# Patient Record
Sex: MALE | Race: WHITE | NOT HISPANIC OR LATINO | URBAN - METROPOLITAN AREA
[De-identification: names, ages, dates, MRNs, and addresses within clinical notes are randomized per-mention and may not be internally consistent; named-entity substitution may affect disease eponyms.]

---

## 2019-06-13 ENCOUNTER — ON CAMPUS - OUTPATIENT (OUTPATIENT)
Dept: URBAN - METROPOLITAN AREA HOSPITAL 77 | Facility: HOSPITAL | Age: 84
End: 2019-06-13
Payer: COMMERCIAL

## 2019-06-13 DIAGNOSIS — K22.2 ESOPHAGEAL OBSTRUCTION: ICD-10-CM

## 2019-06-13 DIAGNOSIS — R13.10 DYSPHAGIA, UNSPECIFIED: ICD-10-CM

## 2019-06-13 PROCEDURE — 43249 ESOPH EGD DILATION <30 MM: CPT | Performed by: INTERNAL MEDICINE

## 2023-03-22 ENCOUNTER — OFFICE (OUTPATIENT)
Dept: URBAN - METROPOLITAN AREA CLINIC 64 | Facility: CLINIC | Age: 88
End: 2023-03-22
Payer: COMMERCIAL

## 2023-03-22 VITALS
SYSTOLIC BLOOD PRESSURE: 167 MMHG | HEART RATE: 65 BPM | DIASTOLIC BLOOD PRESSURE: 90 MMHG | WEIGHT: 199 LBS | HEIGHT: 70 IN

## 2023-03-22 DIAGNOSIS — K22.2 ESOPHAGEAL OBSTRUCTION: ICD-10-CM

## 2023-03-22 DIAGNOSIS — K59.00 CONSTIPATION, UNSPECIFIED: ICD-10-CM

## 2023-03-22 DIAGNOSIS — R13.10 DYSPHAGIA, UNSPECIFIED: ICD-10-CM

## 2023-03-22 PROCEDURE — 99203 OFFICE O/P NEW LOW 30 MIN: CPT

## 2023-03-23 ENCOUNTER — ON CAMPUS - OUTPATIENT (OUTPATIENT)
Dept: URBAN - METROPOLITAN AREA HOSPITAL 77 | Facility: HOSPITAL | Age: 88
End: 2023-03-23
Payer: COMMERCIAL

## 2023-03-23 DIAGNOSIS — K29.70 GASTRITIS, UNSPECIFIED, WITHOUT BLEEDING: ICD-10-CM

## 2023-03-23 DIAGNOSIS — K21.9 GASTRO-ESOPHAGEAL REFLUX DISEASE WITHOUT ESOPHAGITIS: ICD-10-CM

## 2023-03-23 DIAGNOSIS — R13.10 DYSPHAGIA, UNSPECIFIED: ICD-10-CM

## 2023-03-23 DIAGNOSIS — K22.2 ESOPHAGEAL OBSTRUCTION: ICD-10-CM

## 2023-03-23 DIAGNOSIS — K44.9 DIAPHRAGMATIC HERNIA WITHOUT OBSTRUCTION OR GANGRENE: ICD-10-CM

## 2023-03-23 PROCEDURE — 43249 ESOPH EGD DILATION <30 MM: CPT | Performed by: INTERNAL MEDICINE

## 2023-03-23 PROCEDURE — 43239 EGD BIOPSY SINGLE/MULTIPLE: CPT | Mod: 59 | Performed by: INTERNAL MEDICINE

## 2023-04-10 ENCOUNTER — OFFICE (OUTPATIENT)
Dept: URBAN - METROPOLITAN AREA CLINIC 64 | Facility: CLINIC | Age: 88
End: 2023-04-10
Payer: COMMERCIAL

## 2023-04-10 VITALS
HEIGHT: 70 IN | HEART RATE: 68 BPM | WEIGHT: 193 LBS | DIASTOLIC BLOOD PRESSURE: 82 MMHG | SYSTOLIC BLOOD PRESSURE: 138 MMHG

## 2023-04-10 DIAGNOSIS — Z79.82 LONG TERM (CURRENT) USE OF ASPIRIN: ICD-10-CM

## 2023-04-10 DIAGNOSIS — K21.9 GASTRO-ESOPHAGEAL REFLUX DISEASE WITHOUT ESOPHAGITIS: ICD-10-CM

## 2023-04-10 DIAGNOSIS — R06.02 SHORTNESS OF BREATH: ICD-10-CM

## 2023-04-10 DIAGNOSIS — K29.70 GASTRITIS, UNSPECIFIED, WITHOUT BLEEDING: ICD-10-CM

## 2023-04-10 DIAGNOSIS — R13.10 DYSPHAGIA, UNSPECIFIED: ICD-10-CM

## 2023-04-10 DIAGNOSIS — R05.9 COUGH, UNSPECIFIED: ICD-10-CM

## 2023-04-10 PROCEDURE — 99214 OFFICE O/P EST MOD 30 MIN: CPT

## 2023-04-10 RX ORDER — FAMOTIDINE 40 MG/1
TABLET, FILM COATED ORAL
Qty: 90 | Refills: 3 | Status: ACTIVE
Start: 2023-04-10

## 2023-04-10 RX ORDER — GUAIFENESIN DEXTROMETHORPHAN HYDROBROMIDE ORAL SOLUTION 200; 20 MG/10ML; MG/10ML
300 SOLUTION ORAL
Qty: 90 | Refills: 3 | Status: ACTIVE
Start: 2023-04-10

## 2024-02-27 ENCOUNTER — TRANSCRIBE ORDERS (OUTPATIENT)
Dept: PHYSICAL THERAPY | Facility: CLINIC | Age: 89
End: 2024-02-27
Payer: MEDICARE

## 2024-02-27 DIAGNOSIS — M17.12 PRIMARY OSTEOARTHRITIS OF LEFT KNEE: Primary | ICD-10-CM

## 2024-03-04 ENCOUNTER — TREATMENT (OUTPATIENT)
Dept: PHYSICAL THERAPY | Facility: CLINIC | Age: 89
End: 2024-03-04
Payer: MEDICARE

## 2024-03-04 DIAGNOSIS — M25.662 JOINT STIFFNESS OF BOTH KNEES: ICD-10-CM

## 2024-03-04 DIAGNOSIS — Z91.81 PERSONAL HISTORY OF FALL: ICD-10-CM

## 2024-03-04 DIAGNOSIS — M25.661 JOINT STIFFNESS OF BOTH KNEES: ICD-10-CM

## 2024-03-04 DIAGNOSIS — R26.2 DIFFICULTY WALKING: ICD-10-CM

## 2024-03-04 DIAGNOSIS — M17.12 PRIMARY OSTEOARTHRITIS OF LEFT KNEE: Primary | ICD-10-CM

## 2024-03-04 PROCEDURE — 97110 THERAPEUTIC EXERCISES: CPT | Performed by: PHYSICAL THERAPIST

## 2024-03-04 PROCEDURE — 97140 MANUAL THERAPY 1/> REGIONS: CPT | Performed by: PHYSICAL THERAPIST

## 2024-03-04 PROCEDURE — 97161 PT EVAL LOW COMPLEX 20 MIN: CPT | Performed by: PHYSICAL THERAPIST
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
## 2024-03-07 ENCOUNTER — TREATMENT (OUTPATIENT)
Dept: PHYSICAL THERAPY | Facility: CLINIC | Age: 89
End: 2024-03-07
Payer: MEDICARE

## 2024-03-07 DIAGNOSIS — M25.661 JOINT STIFFNESS OF BOTH KNEES: ICD-10-CM

## 2024-03-07 DIAGNOSIS — Z91.81 PERSONAL HISTORY OF FALL: ICD-10-CM

## 2024-03-07 DIAGNOSIS — R26.2 DIFFICULTY WALKING: ICD-10-CM

## 2024-03-07 DIAGNOSIS — M25.662 JOINT STIFFNESS OF BOTH KNEES: ICD-10-CM

## 2024-03-07 DIAGNOSIS — M17.12 PRIMARY OSTEOARTHRITIS OF LEFT KNEE: Primary | ICD-10-CM

## 2024-03-07 PROCEDURE — 97530 THERAPEUTIC ACTIVITIES: CPT | Performed by: PHYSICAL THERAPIST

## 2024-03-07 PROCEDURE — 97110 THERAPEUTIC EXERCISES: CPT | Performed by: PHYSICAL THERAPIST

## 2024-03-07 PROCEDURE — 97112 NEUROMUSCULAR REEDUCATION: CPT | Performed by: PHYSICAL THERAPIST

## 2024-03-07 NOTE — PROGRESS NOTES
Physical Therapy Treatment Note  [facility] [place]  [address], [address]  [place] [address]    Patient: Yaakov Teixeira   : 1930  Referring practitioner: Pio Roland MD  Date of Initial Visit: Type: THERAPY  Noted: 3/4/2024  Today's Date: 3/7/2024  Patient seen for 2 sessions    Diagnosis/ICD-10 Codes:      ICD-10-CM ICD-9-CM   1. Primary osteoarthritis of left knee  M17.12 715.16   2. Joint stiffness of both knees  M25.661 719.56    M25.662    3. Difficulty walking  R26.2 719.7   4. Personal history of fall  Z91.81 V15.88                Subjective       Yaakov Teixeira reports: Reports his knees are feeling much better and he thinks the topical medicine the doctor prescribed for him is helping quite a bit.      Objective   See Exercise, Manual, and Modality Logs for complete treatment.     Reviewed & performed home exercise program below:   Access Code: [email or web address]  URL: [email or web address]  Date: 2024  Prepared by: Damian Conn     Exercises  - Supine Active Straight Leg Raise  - 2 x daily - 7 x weekly - 1 sets - 10 reps  - Hooklying Gluteal Sets  - 2 x daily - 7 x weekly - 1 sets - 10 reps - 5 sec hold  - Supine Hip Adduction Isometric with Ball  - 2 x daily - 7 x weekly - 1 sets - 10 reps - 5 sec hold  - Hooklying Clamshell with Resistance  - 2 x daily - 7 x weekly - 1 sets - 10 reps - 5 sec hold  - Seated Long Arc Quad  - 2 x daily - 7 x weekly - 1 sets - 10 reps - 5 sec hold  - Standing Knee Flexion with Counter Support  - 2 x daily - 7 x weekly - 1 sets - 20 reps  - Heel Toe Raises with Counter Support  - 2 x daily - 7 x weekly - 1 sets - 20 reps    Progressed / advanced exercises as noted in flow sheets; added stretching to promote full knee extension.    Initiated nu-step      Assessment/Plan  Notable subjective improvement.   Good tolerance to home program and interventions in clinic today.    Progress per Plan of Care and Progress strengthening /stabilization  /functional activity. Assess response to today's interventions.           Manual Therapy:    5     mins  96745;  Therapeutic Exercise:    15     mins  71720;     Neuromuscular Beau:    10    mins  62485;    Therapeutic Activity:     10     mins  83560;     Gait Training:           mins  79013;     Ultrasound:          mins  46941;    Electrical Stimulation:         mins  11962 ( );  Dry Needling          mins self-pay    Timed Treatment:   40    mins   Total Treatment:     40   mins    Jossue Conn PT  Physical Therapist

## 2024-03-12 ENCOUNTER — TREATMENT (OUTPATIENT)
Dept: PHYSICAL THERAPY | Facility: CLINIC | Age: 89
End: 2024-03-12
Payer: MEDICARE

## 2024-03-12 DIAGNOSIS — M17.12 PRIMARY OSTEOARTHRITIS OF LEFT KNEE: Primary | ICD-10-CM

## 2024-03-12 DIAGNOSIS — Z91.81 PERSONAL HISTORY OF FALL: ICD-10-CM

## 2024-03-12 DIAGNOSIS — M25.661 JOINT STIFFNESS OF BOTH KNEES: ICD-10-CM

## 2024-03-12 DIAGNOSIS — R26.2 DIFFICULTY WALKING: ICD-10-CM

## 2024-03-12 DIAGNOSIS — M25.662 JOINT STIFFNESS OF BOTH KNEES: ICD-10-CM

## 2024-03-12 PROCEDURE — 97110 THERAPEUTIC EXERCISES: CPT | Performed by: PHYSICAL THERAPIST

## 2024-03-12 PROCEDURE — 97530 THERAPEUTIC ACTIVITIES: CPT | Performed by: PHYSICAL THERAPIST

## 2024-03-12 PROCEDURE — 97112 NEUROMUSCULAR REEDUCATION: CPT | Performed by: PHYSICAL THERAPIST

## 2024-03-12 NOTE — PROGRESS NOTES
"Physical Therapy Treatment Note  [facility] [place]  [address], [address]  [place] [address]    Patient: Yaakov Teixeira   : 1930  Referring practitioner: Pio Roland MD  Date of Initial Visit: Type: THERAPY  Noted: 3/4/2024  Today's Date: 3/12/2024  Patient seen for 3 sessions    Diagnosis/ICD-10 Codes:      ICD-10-CM ICD-9-CM   1. Primary osteoarthritis of left knee  M17.12 715.16   2. Joint stiffness of both knees  M25.661 719.56    M25.662    3. Difficulty walking  R26.2 719.7   4. Personal history of fall  Z91.81 V15.88                Subjective       Yaakov Teixeira reports: Reports his knees are bothering him just a little, but not bad.  He does report feeling a little \"foggy\" from vestibular testing yesterday.      Objective   See Exercise, Manual, and Modality Logs for complete treatment.     Progressed / advanced exercises as noted in flow sheets; continued stretching to promote full knee extension, progressed from glut sets to bridging.    Progressed to resisted knee flex and ext    Continued nu-step    Discharges patellar mobs as joint play now normal.      Assessment/Plan  Slight increase in pain today.  Does feel like he is getting stronger.    Progress per Plan of Care and Progress strengthening /stabilization /functional activity. Assess response to today's interventions.           Manual Therapy:        mins  96528;  Therapeutic Exercise:    20     mins  66128;     Neuromuscular Beau:    10    mins  11198;    Therapeutic Activity:     10     mins  43805;     Gait Training:           mins  94188;     Ultrasound:          mins  80966;    Electrical Stimulation:         mins  75066 ( );  Dry Needling          mins self-pay    Timed Treatment:   40    mins   Total Treatment:     40   mins    Jossue Conn PT  Physical Therapist                    "

## 2024-03-14 ENCOUNTER — TREATMENT (OUTPATIENT)
Dept: PHYSICAL THERAPY | Facility: CLINIC | Age: 89
End: 2024-03-14
Payer: MEDICARE

## 2024-03-14 DIAGNOSIS — M25.662 JOINT STIFFNESS OF BOTH KNEES: ICD-10-CM

## 2024-03-14 DIAGNOSIS — Z91.81 PERSONAL HISTORY OF FALL: ICD-10-CM

## 2024-03-14 DIAGNOSIS — R26.2 DIFFICULTY WALKING: ICD-10-CM

## 2024-03-14 DIAGNOSIS — M17.12 PRIMARY OSTEOARTHRITIS OF LEFT KNEE: Primary | ICD-10-CM

## 2024-03-14 DIAGNOSIS — M25.661 JOINT STIFFNESS OF BOTH KNEES: ICD-10-CM

## 2024-03-14 PROCEDURE — 97530 THERAPEUTIC ACTIVITIES: CPT | Performed by: PHYSICAL THERAPIST

## 2024-03-14 PROCEDURE — 97112 NEUROMUSCULAR REEDUCATION: CPT | Performed by: PHYSICAL THERAPIST

## 2024-03-14 PROCEDURE — 97110 THERAPEUTIC EXERCISES: CPT | Performed by: PHYSICAL THERAPIST

## 2024-03-14 NOTE — PROGRESS NOTES
Physical Therapy Treatment Note  [facility] [place]  [address], [address]  [place] [address]    Patient: Yaakov Teixeira   : 1930  Referring practitioner: Pio Roland MD  Date of Initial Visit: Type: THERAPY  Noted: 3/4/2024  Today's Date: 3/14/2024  Patient seen for 4 sessions    Diagnosis/ICD-10 Codes:      ICD-10-CM ICD-9-CM   1. Primary osteoarthritis of left knee  M17.12 715.16   2. Joint stiffness of both knees  M25.661 719.56    M25.662    3. Difficulty walking  R26.2 719.7   4. Personal history of fall  Z91.81 V15.88                Subjective       Yaakov Teixeira reports: Reports his knees were hurting last night, but are feeling pretty good today.       Objective   See Exercise, Manual, and Modality Logs for complete treatment.     Progressed / advanced exercises as noted in flow sheets; continued stretching to promote full knee extension, progressed from glut sets to bridging.    Progressed to resisted knee flex and ext on machine; added 3 way hip with band.    Continued nu-step      Assessment/Plan  Notable subjective improvement.   Tolerated advancement of strengthening exercises well.    Progress per Plan of Care and Progress strengthening /stabilization /functional activity. Assess response to today's interventions.           Manual Therapy:         mins  50668;  Therapeutic Exercise:   20     mins  08763;     Neuromuscular Beau:    10    mins  68994;    Therapeutic Activity:     10     mins  46210;     Gait Training:           mins  78302;     Ultrasound:          mins  91230;    Electrical Stimulation:         mins  00471 ( );  Dry Needling          mins self-pay    Timed Treatment:   40    mins   Total Treatment:     40   mins    Jossue Conn PT  Physical Therapist

## 2024-03-19 ENCOUNTER — TREATMENT (OUTPATIENT)
Dept: PHYSICAL THERAPY | Facility: CLINIC | Age: 89
End: 2024-03-19
Payer: MEDICARE

## 2024-03-19 DIAGNOSIS — Z91.81 PERSONAL HISTORY OF FALL: ICD-10-CM

## 2024-03-19 DIAGNOSIS — M17.12 PRIMARY OSTEOARTHRITIS OF LEFT KNEE: Primary | ICD-10-CM

## 2024-03-19 DIAGNOSIS — R26.2 DIFFICULTY WALKING: ICD-10-CM

## 2024-03-19 DIAGNOSIS — M25.662 JOINT STIFFNESS OF BOTH KNEES: ICD-10-CM

## 2024-03-19 DIAGNOSIS — M25.661 JOINT STIFFNESS OF BOTH KNEES: ICD-10-CM

## 2024-03-19 PROCEDURE — 97530 THERAPEUTIC ACTIVITIES: CPT | Performed by: PHYSICAL THERAPIST

## 2024-03-19 PROCEDURE — 97110 THERAPEUTIC EXERCISES: CPT | Performed by: PHYSICAL THERAPIST

## 2024-03-19 NOTE — PROGRESS NOTES
Physical Therapy Daily Treatment Note    Patient: Yaakov Teixeira  : 1930  Referring practitioner: Pio Roland MD  Today's Date: 3/19/2024    VISIT#: 5      Subjective   Yaakov Teixeira reports: Doing ok, but left knee is pretty sore today, more so on inside of knee rather than outside of knee.      Objective     See Exercise, Manual, and Modality Logs for complete treatment.     Patient Education: continue HEP    Assessment/Plan  Continued gentle ROM and strengthening exercises as tolerated, focusing on knee stability & quad strengthening. Overall good response to session and minimal reports of pain.     Progress per Plan of Care            Timed:         Manual Therapy:         mins  37723;     Therapeutic Exercise:    15     mins  53490;     Neuromuscular Beau:        mins  81730;    Therapeutic Activity:     10     mins  24554;     Gait Training:           mins  11757;     Ultrasound:          mins  13342;    Ionto:                                   mins   32345  Self Care:                            mins   36650    Un-Timed:  Electrical Stimulation:         mins  50997 ( );  Dry Needling          mins self-pay  Traction          mins 63786  Re-Eval                               mins  46717    Timed Treatment:   25   mins   Total Treatment:     25   mins    Kelley Clarke, PT  Physical Therapist  [place] PT license #: [id number]  [place] PT license #: [id number]

## 2024-03-21 ENCOUNTER — TREATMENT (OUTPATIENT)
Dept: PHYSICAL THERAPY | Facility: CLINIC | Age: 89
End: 2024-03-21
Payer: MEDICARE

## 2024-03-21 DIAGNOSIS — M25.661 JOINT STIFFNESS OF BOTH KNEES: ICD-10-CM

## 2024-03-21 DIAGNOSIS — M17.12 PRIMARY OSTEOARTHRITIS OF LEFT KNEE: Primary | ICD-10-CM

## 2024-03-21 DIAGNOSIS — R26.2 DIFFICULTY WALKING: ICD-10-CM

## 2024-03-21 DIAGNOSIS — Z91.81 PERSONAL HISTORY OF FALL: ICD-10-CM

## 2024-03-21 DIAGNOSIS — M25.662 JOINT STIFFNESS OF BOTH KNEES: ICD-10-CM

## 2024-03-21 PROCEDURE — 97110 THERAPEUTIC EXERCISES: CPT | Performed by: PHYSICAL THERAPIST

## 2024-03-21 PROCEDURE — 97112 NEUROMUSCULAR REEDUCATION: CPT | Performed by: PHYSICAL THERAPIST

## 2024-03-21 PROCEDURE — 97530 THERAPEUTIC ACTIVITIES: CPT | Performed by: PHYSICAL THERAPIST

## 2024-03-21 NOTE — PROGRESS NOTES
Physical Therapy Daily Treatment Note    Patient: Yaakov Teixeira  : 1930  Referring practitioner: Pio Roland MD  Today's Date: 3/21/2024    VISIT#: 6      Subjective   Yaakov Teixeira reports: Doing alright, knees aren't too bad today, they were really sore yesterday though.       Objective     See Exercise, Manual, and Modality Logs for complete treatment.     Patient Education: continue HEP.    Assessment/Plan  Good response to session, progressed to balance activity today with good response. He is unsteady but only required CGA, no Dionte today. Continuing to work on strengthening exercises to improve knee stability.     Progress per Plan of Care            Timed:         Manual Therapy:         mins  95431;     Therapeutic Exercise:    20     mins  11229;     Neuromuscular Beau:    10    mins  67772;    Therapeutic Activity:     10     mins  61593;     Gait Training:           mins  54582;     Ultrasound:          mins  56481;    Ionto:                                   mins   00941  Self Care:                            mins   27009    Un-Timed:  Electrical Stimulation:         mins  14179 ( );  Dry Needling          mins self-pay  Traction          mins 69237  Re-Eval                               mins  39628    Timed Treatment:   40   mins   Total Treatment:     40   mins    Kelley Clarke, PT  Physical Therapist  [place] PT license #: [id number]  [place] PT license #: [id number]

## 2024-03-27 ENCOUNTER — TREATMENT (OUTPATIENT)
Dept: PHYSICAL THERAPY | Facility: CLINIC | Age: 89
End: 2024-03-27
Payer: MEDICARE

## 2024-03-27 DIAGNOSIS — Z91.81 PERSONAL HISTORY OF FALL: ICD-10-CM

## 2024-03-27 DIAGNOSIS — M25.662 JOINT STIFFNESS OF BOTH KNEES: ICD-10-CM

## 2024-03-27 DIAGNOSIS — M25.661 JOINT STIFFNESS OF BOTH KNEES: ICD-10-CM

## 2024-03-27 DIAGNOSIS — M17.12 PRIMARY OSTEOARTHRITIS OF LEFT KNEE: Primary | ICD-10-CM

## 2024-03-27 DIAGNOSIS — R26.2 DIFFICULTY WALKING: ICD-10-CM

## 2024-03-27 PROCEDURE — 97530 THERAPEUTIC ACTIVITIES: CPT | Performed by: PHYSICAL THERAPIST

## 2024-03-27 PROCEDURE — 97110 THERAPEUTIC EXERCISES: CPT | Performed by: PHYSICAL THERAPIST

## 2024-03-27 PROCEDURE — 97112 NEUROMUSCULAR REEDUCATION: CPT | Performed by: PHYSICAL THERAPIST

## 2024-03-27 NOTE — PROGRESS NOTES
Physical Therapy Treatment Note  [facility] [place]  [address], [address]  [place] [address]    Patient: Yaakov Teixeira   : 1930  Referring practitioner: Pio Roland MD  Date of Initial Visit: Type: THERAPY  Noted: 3/4/2024  Today's Date: 3/27/2024  Patient seen for 7 sessions    Diagnosis/ICD-10 Codes:      ICD-10-CM ICD-9-CM   1. Primary osteoarthritis of left knee  M17.12 715.16   2. Joint stiffness of both knees  M25.661 719.56    M25.662    3. Difficulty walking  R26.2 719.7   4. Personal history of fall  Z91.81 V15.88                Subjective       Yaakov Teixeira reports: Reports his left knee is doing much better which he attributes to the topical creme he is using. Still a little weaker in left leg than right and cannot do stairs reciprocally,    Objective   See Exercise, Manual, and Modality Logs for complete treatment.     Progressed / advanced exercises as noted in flow sheets; continued stretching to promote full knee extension, progressed from glut sets to bridging.    Progressed to resisted knee flex and ext on machine; added 3 way hip with band.    Continued nu-step      Assessment/Plan  Notable subjective improvement.   Tolerated advancement of strengthening exercises well.  Doing better in regards to knee pain    Progress per Plan of Care and Progress strengthening /stabilization /functional activity. Assess response to today's interventions.           Manual Therapy:         mins  23628;  Therapeutic Exercise:   20     mins  15815;     Neuromuscular Beau:    10    mins  44734;    Therapeutic Activity:     10     mins  37310;     Gait Training:           mins  84163;     Ultrasound:          mins  92177;    Electrical Stimulation:         mins  69744 ( );  Dry Needling          mins self-pay    Timed Treatment:   40    mins   Total Treatment:     40   mins    Jossue Conn PT  Physical Therapist

## 2024-04-01 ENCOUNTER — TREATMENT (OUTPATIENT)
Dept: PHYSICAL THERAPY | Facility: CLINIC | Age: 89
End: 2024-04-01
Payer: MEDICARE

## 2024-04-01 DIAGNOSIS — R26.2 DIFFICULTY WALKING: ICD-10-CM

## 2024-04-01 DIAGNOSIS — Z91.81 PERSONAL HISTORY OF FALL: ICD-10-CM

## 2024-04-01 DIAGNOSIS — M25.662 JOINT STIFFNESS OF BOTH KNEES: ICD-10-CM

## 2024-04-01 DIAGNOSIS — M25.661 JOINT STIFFNESS OF BOTH KNEES: ICD-10-CM

## 2024-04-01 DIAGNOSIS — M17.12 PRIMARY OSTEOARTHRITIS OF LEFT KNEE: Primary | ICD-10-CM

## 2024-04-01 PROCEDURE — 97530 THERAPEUTIC ACTIVITIES: CPT | Performed by: PHYSICAL THERAPIST

## 2024-04-01 PROCEDURE — 97112 NEUROMUSCULAR REEDUCATION: CPT | Performed by: PHYSICAL THERAPIST

## 2024-04-01 PROCEDURE — 97110 THERAPEUTIC EXERCISES: CPT | Performed by: PHYSICAL THERAPIST

## 2024-04-01 NOTE — PROGRESS NOTES
Physical Therapy Treatment Note  [facility] [place]  [address], [address]  [place] [address]    Patient: Yaakov Teixeira   : 1930  Referring practitioner: Pio Roland MD  Date of Initial Visit: Type: THERAPY  Noted: 3/4/2024  Today's Date: 2024  Patient seen for 8 sessions    Diagnosis/ICD-10 Codes:      ICD-10-CM ICD-9-CM   1. Primary osteoarthritis of left knee  M17.12 715.16   2. Joint stiffness of both knees  M25.661 719.56    M25.662    3. Difficulty walking  R26.2 719.7   4. Personal history of fall  Z91.81 V15.88                Subjective   Yaakov Teixeira reports: Reports his knees were hurting over the weekend. They are sore today.       Objective   See Exercise, Manual, and Modality Logs for complete treatment.   Min antalgic gait       Assessment/Plan  Pt demonstrates good progress with exercises. Did have some SOA after bridges, however improved quickly with short rest break. Added STS and SLS and pt able to perform well with min assist from counter/raised table. No c/o increased pain in knees.     Progress per Plan of Care and Progress strengthening /stabilization /functional activity. Assess response to today's interventions.           Manual Therapy:         mins  83434;  Therapeutic Exercise:   20     mins  91817;     Neuromuscular Beau:    10    mins  84183;    Therapeutic Activity:     10     mins  30073;     Gait Training:           mins  73401;     Ultrasound:          mins  55836;    Electrical Stimulation:         mins  73044 ( );  Dry Needling          mins self-pay    Timed Treatment:   40    mins   Total Treatment:     40   mins    Mely Browne, PT  Physical Therapist

## 2024-04-04 ENCOUNTER — TREATMENT (OUTPATIENT)
Dept: PHYSICAL THERAPY | Facility: CLINIC | Age: 89
End: 2024-04-04
Payer: MEDICARE

## 2024-04-04 DIAGNOSIS — M25.662 JOINT STIFFNESS OF BOTH KNEES: ICD-10-CM

## 2024-04-04 DIAGNOSIS — M17.12 PRIMARY OSTEOARTHRITIS OF LEFT KNEE: Primary | ICD-10-CM

## 2024-04-04 DIAGNOSIS — R26.2 DIFFICULTY WALKING: ICD-10-CM

## 2024-04-04 DIAGNOSIS — Z91.81 PERSONAL HISTORY OF FALL: ICD-10-CM

## 2024-04-04 DIAGNOSIS — M25.661 JOINT STIFFNESS OF BOTH KNEES: ICD-10-CM

## 2024-04-04 PROCEDURE — 97112 NEUROMUSCULAR REEDUCATION: CPT | Performed by: PHYSICAL THERAPIST

## 2024-04-04 PROCEDURE — 97530 THERAPEUTIC ACTIVITIES: CPT | Performed by: PHYSICAL THERAPIST

## 2024-04-04 PROCEDURE — 97110 THERAPEUTIC EXERCISES: CPT | Performed by: PHYSICAL THERAPIST

## 2024-04-04 NOTE — PROGRESS NOTES
Re-Assessment / Progress Note  [facility] [place]  [address], [address]  [place] [address]  Patient: Yaakov Teixeira   : 1930  Diagnosis/ICD-10 Code:  Primary osteoarthritis of left knee [M17.12]  Referring practitioner: Pio Roland MD  Date of Initial Visit: Episode Type: THERAPY  Noted: 3/4/2024    Today's Date: 2024  Patient seen for 9 sessions.      Subjective:     Subjective Questionnaire: LEFS:  31%  (22% at initial evaluation)   Clinical Progress: improved  Home Program Compliance: Yes  Treatment has included: therapeutic exercise, neuromuscular re-education, and therapeutic activity    Subjective     Yaakov Teixeira reports: A little dizzy today and knee are hurting more.  He feels like he has improved a lot with therapy thus far and his legs are much stronger.   He is going to be leaving for [place] next week.    Pain  Current pain ratin  At best pain ratin  At worst pain ratin  Location: just below kneecaps bilaterally  Quality: sharp and pressure  Aggravating factors: ambulation (hurts more upon waking, prolonged walking, standing)  Progression: no change    Objective        Strength/Myotome Testing      Left Hip   Planes of Motion   Flexion: 5  Extension:5  Abduction: 5  Adduction: 5     Right Hip   Planes of Motion   Flexion: 5  Extension: 5  Abduction: 5  Adduction: 5     Left Knee   Flexion: 5  Extension: 5     Right Knee   Flexion: 5  Extension: 5     Ambulation   Weight-Bearing Status   Assistive device used: none     Ambulation: Level Surfaces   Ambulation without assistive device: independent     Additional Level Surfaces Ambulation Details  unsteady     Ambulation: Stairs   Ascend stairs: independent  Pattern: non-reciprocal  Railings: one rail  Descend stairs: independent  Pattern: non-reciprocal  Railings: one rail     Observational Gait   Decreased walking speed.      Quality of Movement During Gait   Trunk  Forward lean.      Knee     Knee (Left): Positive  varus.   Knee (Right): Positive varus.       See Exercise, Manual, and Modality Logs for complete treatment      Assessment/Plan  Yaakov Teixeira has demonstrated good improvement with his lower extremity strength, mobility and gait.  He still has some knee pain, but is doing much better overall.   He is leaving for [place] after his next visit.  Recommend seeing him for that last visit with discharge with independent home program afterwards,      Progress toward previous goals: Partially Met    Goals    Short-term goals (STG): 2/3  Long-term goals (LTG): 3/5    STG (4 weeks)     1) independent in home exercise program for lower extremity range of motion and strengthening  (MET)   2) Will demonstrate moderate improvement in tolerance to daily activities as evidenced by LEFS score of 35% or greater.  (NOT MET)   3) will tolerate initiation of resisted and closed kinetic chain exercises for bilateral lower extremities (MET)      LTG (12 weeks)     1) independent in home exercise program for self management of condition.  (NOT MET)   2) Will demonstrate significant improvement in tolerance to daily activities as evidenced by LEFS score of 70% or greater. (NOT MET)   3) will ambulate in community without assistive device and with normal gait pattern. (MET)   4) Will demonstrate MMT of (R) knee and hip 5/5 or greater to allow for full use of knee with  transfers, gait and functional mobility. (MET)   5) patient will report being able to walk without walker in community  (MET)         Recommendations: See one additional visit then d/c    Jossue Conn PT  Physical Therapist       Manual Therapy:                 mins  68818;  Therapeutic Exercise:   20     mins  36176;     Neuromuscular Beau:    10    mins  65304;    Therapeutic Activity:      10     mins  17341;     Gait Training:                      mins  30742;     Ultrasound:                          mins  82246;    Electrical Stimulation:         mins  52393 (  );  Dry Needling                       mins self-pay     Timed Treatment:   40               mins   Total Treatment:     40   mins

## 2024-04-04 NOTE — LETTER
Re-Assessment / Progress Note  [facility] [place]  [address], [address]  [place] [address]  Patient: Yaakov Teixeira   : 1930  Diagnosis/ICD-10 Code:  Primary osteoarthritis of left knee [M17.12]  Referring practitioner: Pio Roland MD  Date of Initial Visit: Episode Type: THERAPY  Noted: 3/4/2024    Today's Date: 2024  Patient seen for 9 sessions.      Subjective:     Subjective Questionnaire: LEFS:  31%  (22% at initial evaluation)   Clinical Progress: improved  Home Program Compliance: Yes  Treatment has included: therapeutic exercise, neuromuscular re-education, and therapeutic activity    Subjective     Yaakov Teixeira reports: A little dizzy today and knee are hurting more.  He feels like he has improved a lot with therapy thus far and his legs are much stronger.   He is going to be leaving for [place] next week.    Pain  Current pain ratin  At best pain ratin  At worst pain ratin  Location: just below kneecaps bilaterally  Quality: sharp and pressure  Aggravating factors: ambulation (hurts more upon waking, prolonged walking, standing)  Progression: no change    Objective        Strength/Myotome Testing      Left Hip   Planes of Motion   Flexion: 5  Extension:5  Abduction: 5  Adduction: 5     Right Hip   Planes of Motion   Flexion: 5  Extension: 5  Abduction: 5  Adduction: 5     Left Knee   Flexion: 5  Extension: 5     Right Knee   Flexion: 5  Extension: 5     Ambulation   Weight-Bearing Status   Assistive device used: none     Ambulation: Level Surfaces   Ambulation without assistive device: independent     Additional Level Surfaces Ambulation Details  unsteady     Ambulation: Stairs   Ascend stairs: independent  Pattern: non-reciprocal  Railings: one rail  Descend stairs: independent  Pattern: non-reciprocal  Railings: one rail     Observational Gait   Decreased walking speed.      Quality of Movement During Gait   Trunk  Forward lean.      Knee     Knee (Left): Positive  varus.   Knee (Right): Positive varus.       See Exercise, Manual, and Modality Logs for complete treatment      Assessment/Plan  Yaakov Teixeira has demonstrated good improvement with his lower extremity strength, mobility and gait.  He still has some knee pain, but is doing much better overall.   He is leaving for [place] after his next visit.  Recommend seeing him for that last visit with discharge with independent home program afterwards,      Progress toward previous goals: Partially Met    Goals    Short-term goals (STG): 2/3  Long-term goals (LTG): 3/5    STG (4 weeks)     1) independent in home exercise program for lower extremity range of motion and strengthening  (MET)   2) Will demonstrate moderate improvement in tolerance to daily activities as evidenced by LEFS score of 35% or greater.  (NOT MET)   3) will tolerate initiation of resisted and closed kinetic chain exercises for bilateral lower extremities (MET)      LTG (12 weeks)     1) independent in home exercise program for self management of condition.  (NOT MET)   2) Will demonstrate significant improvement in tolerance to daily activities as evidenced by LEFS score of 70% or greater. (NOT MET)   3) will ambulate in community without assistive device and with normal gait pattern. (MET)   4) Will demonstrate MMT of (R) knee and hip 5/5 or greater to allow for full use of knee with  transfers, gait and functional mobility. (MET)   5) patient will report being able to walk without walker in community  (MET)         Recommendations: See one additional visit then d/dany Conn, PT  Physical Therapist    Thank you for allowing us to care for your patient!

## 2024-04-09 ENCOUNTER — TREATMENT (OUTPATIENT)
Dept: PHYSICAL THERAPY | Facility: CLINIC | Age: 89
End: 2024-04-09
Payer: MEDICARE

## 2024-04-09 DIAGNOSIS — M25.662 JOINT STIFFNESS OF BOTH KNEES: ICD-10-CM

## 2024-04-09 DIAGNOSIS — R26.2 DIFFICULTY WALKING: ICD-10-CM

## 2024-04-09 DIAGNOSIS — M25.661 JOINT STIFFNESS OF BOTH KNEES: ICD-10-CM

## 2024-04-09 DIAGNOSIS — Z91.81 PERSONAL HISTORY OF FALL: ICD-10-CM

## 2024-04-09 DIAGNOSIS — M17.12 PRIMARY OSTEOARTHRITIS OF LEFT KNEE: Primary | ICD-10-CM

## 2024-04-09 PROCEDURE — 97530 THERAPEUTIC ACTIVITIES: CPT | Performed by: PHYSICAL THERAPIST

## 2024-04-09 PROCEDURE — 97110 THERAPEUTIC EXERCISES: CPT | Performed by: PHYSICAL THERAPIST

## 2024-04-09 PROCEDURE — 97112 NEUROMUSCULAR REEDUCATION: CPT | Performed by: PHYSICAL THERAPIST

## 2024-04-09 NOTE — LETTER
Discharge Summary  [facility] [place]  [address], [address]  [place] [address]  Patient: Yaakov Teixeira   : 1930  Diagnosis/ICD-10 Code:  Primary osteoarthritis of left knee [M17.12]  Referring practitioner: Pio Roland MD  Date of Initial Visit: Episode Type: THERAPY  Noted: 3/4/2024    Today's Date: 2024  Patient seen for 10 sessions.      Subjective:     Subjective Questionnaire: LEFS:  31% (22% at initial evaluation)   Clinical Progress: improved  Home Program Compliance: Yes  Treatment has included: therapeutic exercise, neuromuscular re-education, and therapeutic activity    Subjective     Yaakov Teixeira reports: A little dizzy today and knee are hurting more.  He feels like he has improved a lot with therapy thus far and his legs are much stronger.   He is going to be leaving for [place] next week.    Pain  Current pain ratin  At best pain ratin  At worst pain ratin  Location: just below kneecaps bilaterally  Quality: sharp and pressure  Aggravating factors: ambulation (hurts more upon waking, prolonged walking, standing)  Progression: no change    Objective        Strength/Myotome Testing      Left Hip   Planes of Motion   Flexion: 5  Extension:5  Abduction: 5  Adduction: 5     Right Hip   Planes of Motion   Flexion: 5  Extension: 5  Abduction: 5  Adduction: 5     Left Knee   Flexion: 5  Extension: 5     Right Knee   Flexion: 5  Extension: 5     Ambulation   Weight-Bearing Status   Assistive device used: none     Ambulation: Level Surfaces   Ambulation without assistive device: independent     Additional Level Surfaces Ambulation Details  unsteady     Ambulation: Stairs   Ascend stairs: independent  Pattern: non-reciprocal  Railings: one rail  Descend stairs: independent  Pattern: non-reciprocal  Railings: one rail     Observational Gait   Decreased walking speed.      Quality of Movement During Gait   Trunk  Forward lean.      Knee     Knee (Left): Positive varus.   Knee  (Right): Positive varus.       See Exercise, Manual, and Modality Logs for complete treatment      Assessment/Plan  Yaakov Teixeira has done very well with therapy, has improved his lower extremity strength, balance and the quality of his gait.  He no longer needs a walker for most of his ambulation in the community.  He is leaving the area, is pleased with his outcome and voices readiness for d/c.    Progress toward previous goals: Partially Met    Goals    Short-term goals (STG): 2/3  Long-term goals (LTG): 4/5    STG (4 weeks)     1) independent in home exercise program for lower extremity range of motion and strengthening  (MET)   2) Will demonstrate moderate improvement in tolerance to daily activities as evidenced by LEFS score of 35% or greater. (NOT MET)   3) will tolerate initiation of resisted and closed kinetic chain exercises for bilateral lower extremities (MET)      LTG (12 weeks)     1) independent in home exercise program for self management of condition.  (MET)   2) Will demonstrate significant improvement in tolerance to daily activities as evidenced by LEFS score of 70% or greater. (NOT MET)   3) will ambulate in community without assistive device and with normal gait pattern. (MET)   4) Will demonstrate MMT of (R) knee and hip 5/5 or greater to allow for full use of knee with transfers) knee, gait and functional mobility. (MET)   5) patient will report being able to walk without walker in community. (MET)         Recommendations: Discharge    PT Signature: Jossue Conn PT    Thank you for allowing us to care for your patient!

## 2024-04-09 NOTE — PROGRESS NOTES
Discharge Summary  [facility] [place]  [address], [address]  [place] [address]  Patient: Yaakov Teixeira   : 1930  Diagnosis/ICD-10 Code:  Primary osteoarthritis of left knee [M17.12]  Referring practitioner: Pio Roland MD  Date of Initial Visit: Episode Type: THERAPY  Noted: 3/4/2024    Today's Date: 2024  Patient seen for 10 sessions.      Subjective:     Subjective Questionnaire: LEFS:  31% (22% at initial evaluation)   Clinical Progress: improved  Home Program Compliance: Yes  Treatment has included: therapeutic exercise, neuromuscular re-education, and therapeutic activity    Subjective     Yaakov Teixeira reports: A little dizzy today and knee are hurting more.  He feels like he has improved a lot with therapy thus far and his legs are much stronger.   He is going to be leaving for [place] next week.    Pain  Current pain ratin  At best pain ratin  At worst pain ratin  Location: just below kneecaps bilaterally  Quality: sharp and pressure  Aggravating factors: ambulation (hurts more upon waking, prolonged walking, standing)  Progression: no change    Objective        Strength/Myotome Testing      Left Hip   Planes of Motion   Flexion: 5  Extension:5  Abduction: 5  Adduction: 5     Right Hip   Planes of Motion   Flexion: 5  Extension: 5  Abduction: 5  Adduction: 5     Left Knee   Flexion: 5  Extension: 5     Right Knee   Flexion: 5  Extension: 5     Ambulation   Weight-Bearing Status   Assistive device used: none     Ambulation: Level Surfaces   Ambulation without assistive device: independent     Additional Level Surfaces Ambulation Details  unsteady     Ambulation: Stairs   Ascend stairs: independent  Pattern: non-reciprocal  Railings: one rail  Descend stairs: independent  Pattern: non-reciprocal  Railings: one rail     Observational Gait   Decreased walking speed.      Quality of Movement During Gait   Trunk  Forward lean.      Knee     Knee (Left): Positive varus.   Knee  (Right): Positive varus.       See Exercise, Manual, and Modality Logs for complete treatment      Assessment/Plan  Yaakov Teixeira has done very well with therapy, has improved his lower extremity strength, balance and the quality of his gait.  He no longer needs a walker for most of his ambulation in the community.  He is leaving the area, is pleased with his outcome and voices readiness for d/c.    Progress toward previous goals: Partially Met    Goals    Short-term goals (STG): 2/3  Long-term goals (LTG): 4/5    STG (4 weeks)     1) independent in home exercise program for lower extremity range of motion and strengthening  (MET)   2) Will demonstrate moderate improvement in tolerance to daily activities as evidenced by LEFS score of 35% or greater. (NOT MET)   3) will tolerate initiation of resisted and closed kinetic chain exercises for bilateral lower extremities (MET)      LTG (12 weeks)     1) independent in home exercise program for self management of condition.  (MET)   2) Will demonstrate significant improvement in tolerance to daily activities as evidenced by LEFS score of 70% or greater. (NOT MET)   3) will ambulate in community without assistive device and with normal gait pattern. (MET)   4) Will demonstrate MMT of (R) knee and hip 5/5 or greater to allow for full use of knee with transfers) knee, gait and functional mobility. (MET)   5) patient will report being able to walk without walker in community. (MET)         Recommendations: Discharge    PT Signature: Jossue Conn, PT      Manual Therapy:                 mins  63966;  Therapeutic Exercise:   20     mins  68833;     Neuromuscular Beau:    10    mins  11807;    Therapeutic Activity:      10     mins  01380;     Gait Training:                      mins  89016;     Ultrasound:                          mins  85567;    Electrical Stimulation:         mins  15575 ( );  Dry Needling                       mins self-pay     Timed Treatment:   40  [de-identified] : 5/12/2022\par rsr no acute abn               mins   Total Treatment:     40   mins